# Patient Record
Sex: MALE | Race: BLACK OR AFRICAN AMERICAN | Employment: OTHER
[De-identification: names, ages, dates, MRNs, and addresses within clinical notes are randomized per-mention and may not be internally consistent; named-entity substitution may affect disease eponyms.]

---

## 2022-12-05 ENCOUNTER — OFFICE VISIT (OUTPATIENT)
Dept: INTERNAL MEDICINE CLINIC | Facility: CLINIC | Age: 57
End: 2022-12-05
Payer: COMMERCIAL

## 2022-12-05 VITALS
WEIGHT: 186 LBS | OXYGEN SATURATION: 99 % | BODY MASS INDEX: 23.13 KG/M2 | DIASTOLIC BLOOD PRESSURE: 94 MMHG | HEART RATE: 89 BPM | SYSTOLIC BLOOD PRESSURE: 170 MMHG | HEIGHT: 75 IN

## 2022-12-05 DIAGNOSIS — I49.9 IRREGULAR CARDIAC RHYTHM: ICD-10-CM

## 2022-12-05 DIAGNOSIS — I51.7 LVH (LEFT VENTRICULAR HYPERTROPHY): ICD-10-CM

## 2022-12-05 DIAGNOSIS — Z12.5 SCREENING FOR PROSTATE CANCER: ICD-10-CM

## 2022-12-05 DIAGNOSIS — R03.0 ELEVATED BLOOD PRESSURE READING: Primary | ICD-10-CM

## 2022-12-05 LAB
ERYTHROCYTE [DISTWIDTH] IN BLOOD BY AUTOMATED COUNT: 13.4 % (ref 11.9–14.6)
HCT VFR BLD AUTO: 45.2 % (ref 41.1–50.3)
HGB BLD-MCNC: 14.2 G/DL (ref 13.6–17.2)
MCH RBC QN AUTO: 30.2 PG (ref 26.1–32.9)
MCHC RBC AUTO-ENTMCNC: 31.4 G/DL (ref 31.4–35)
MCV RBC AUTO: 96.2 FL (ref 82–102)
NRBC # BLD: 0 K/UL (ref 0–0.2)
PLATELET # BLD AUTO: 310 K/UL (ref 150–450)
PMV BLD AUTO: 9.5 FL (ref 9.4–12.3)
RBC # BLD AUTO: 4.7 M/UL (ref 4.23–5.6)
WBC # BLD AUTO: 3.1 K/UL (ref 4.3–11.1)

## 2022-12-05 PROCEDURE — G8420 CALC BMI NORM PARAMETERS: HCPCS | Performed by: INTERNAL MEDICINE

## 2022-12-05 PROCEDURE — G8427 DOCREV CUR MEDS BY ELIG CLIN: HCPCS | Performed by: INTERNAL MEDICINE

## 2022-12-05 PROCEDURE — G8484 FLU IMMUNIZE NO ADMIN: HCPCS | Performed by: INTERNAL MEDICINE

## 2022-12-05 PROCEDURE — 4004F PT TOBACCO SCREEN RCVD TLK: CPT | Performed by: INTERNAL MEDICINE

## 2022-12-05 PROCEDURE — 93000 ELECTROCARDIOGRAM COMPLETE: CPT | Performed by: INTERNAL MEDICINE

## 2022-12-05 PROCEDURE — 99204 OFFICE O/P NEW MOD 45 MIN: CPT | Performed by: INTERNAL MEDICINE

## 2022-12-05 PROCEDURE — 3017F COLORECTAL CA SCREEN DOC REV: CPT | Performed by: INTERNAL MEDICINE

## 2022-12-05 RX ORDER — AMLODIPINE BESYLATE 5 MG/1
5 TABLET ORAL DAILY
Qty: 30 TABLET | Refills: 5 | Status: SHIPPED | OUTPATIENT
Start: 2022-12-05

## 2022-12-05 ASSESSMENT — ENCOUNTER SYMPTOMS
CHEST TIGHTNESS: 0
SHORTNESS OF BREATH: 0
WHEEZING: 0
CONSTIPATION: 0
ABDOMINAL PAIN: 0
DIARRHEA: 0
CHOKING: 0
COUGH: 0
BACK PAIN: 0

## 2022-12-05 NOTE — PROGRESS NOTES
Yisel Overton was seen today for new patient. Diagnoses and all orders for this visit:    Elevated blood pressure reading  -     EKG 12 Lead; Future  -     Magnesium; Future  -     Lipid Panel; Future  -     CBC; Future  -     Comprehensive Metabolic Panel; Future  -     Microalbumin / Creatinine Urine Ratio; Future  -     Hepatitis Panel, Acute; Future  -     HIV 1/2 Ag/Ab, 4TH Generation,W Rflx Confirm; Future  -     TSH; Future  -     EKG 12 Lead  -     TSH  -     HIV 1/2 Ag/Ab, 4TH Generation,W Rflx Confirm  -     Hepatitis Panel, Acute  -     Microalbumin / Creatinine Urine Ratio  -     Comprehensive Metabolic Panel  -     CBC  -     Lipid Panel  -     Magnesium    Irregular cardiac rhythm  -     EKG 12 Lead; Future  -     Magnesium; Future  -     Lipid Panel; Future  -     CBC; Future  -     Comprehensive Metabolic Panel; Future  -     Microalbumin / Creatinine Urine Ratio; Future  -     Hepatitis Panel, Acute; Future  -     HIV 1/2 Ag/Ab, 4TH Generation,W Rflx Confirm; Future  -     TSH; Future  -     EKG 12 Lead  -     TSH  -     HIV 1/2 Ag/Ab, 4TH Generation,W Rflx Confirm  -     Hepatitis Panel, Acute  -     Microalbumin / Creatinine Urine Ratio  -     Comprehensive Metabolic Panel  -     CBC  -     Lipid Panel  -     Magnesium    LVH (left ventricular hypertrophy)  -     amLODIPine (NORVASC) 5 MG tablet; Take 1 tablet by mouth daily  -     [address]    Screening for prostate cancer  -     PSA Screening; Future        Fauzia Sequeira is a 61 y.o. male    Chief Complaint   Patient presents with    New Patient     Needs PCP   Nothin g much going on  Hasnt' had healthcare        No results found for any previous visit. History reviewed. No pertinent past medical history.     Family History   Problem Relation Age of Onset    Hypertension Father         Social History     Socioeconomic History    Marital status:      Spouse name: Not on file    Number of children: Not on file    Years of education: Not on file    Highest education level: Not on file   Occupational History    Not on file   Tobacco Use    Smoking status: Every Day     Types: Cigarettes    Smokeless tobacco: Current     Types: Snuff   Vaping Use    Vaping Use: Never used   Substance and Sexual Activity    Alcohol use: Yes     Alcohol/week: 12.0 standard drinks     Types: 12 Cans of beer per week     Comment: 1 pint liquor    Drug use: Yes     Types: Marijuana Bladimir Shoemaker), Cocaine     Comment: counseled    Sexual activity: Not on file   Other Topics Concern    Not on file   Social History Narrative    Not on file     Social Determinants of Health     Financial Resource Strain: Not on file   Food Insecurity: Not on file   Transportation Needs: Not on file   Physical Activity: Not on file   Stress: Not on file   Social Connections: Not on file   Intimate Partner Violence: Not on file   Housing Stability: Not on file         Current Outpatient Medications:     amLODIPine (NORVASC) 5 MG tablet, Take 1 tablet by mouth daily, Disp: 30 tablet, Rfl: 5    No Known Allergies      Review of Systems   Constitutional:  Negative for fatigue and unexpected weight change. HENT:  Negative for congestion and hearing loss. Respiratory:  Negative for cough, choking, chest tightness, shortness of breath and wheezing. Cardiovascular:  Negative for chest pain and leg swelling. Gastrointestinal:  Negative for abdominal pain, constipation and diarrhea. Genitourinary:  Negative for dysuria. Musculoskeletal:  Negative for arthralgias and back pain. Skin:  Negative for pallor and rash. Neurological:  Negative for dizziness and headaches. Hematological:  Does not bruise/bleed easily. Psychiatric/Behavioral:  Negative for behavioral problems, sleep disturbance and suicidal ideas. The patient is not nervous/anxious.         Vitals:    12/05/22 1118 12/05/22 1131   BP: (!) 180/88 (!) 170/94   Pulse: 89    SpO2: 99%    Weight: 186 lb (84.4 kg)    Height: 6' 3\" (1.905 m)            Physical Exam  Constitutional:       General: He is not in acute distress. HENT:      Head: Normocephalic and atraumatic. Nose: Nose normal.   Eyes:      General: No scleral icterus. Extraocular Movements: Extraocular movements intact. Conjunctiva/sclera: Conjunctivae normal.   Cardiovascular:      Rate and Rhythm: Normal rate and regular rhythm. Pulses: Normal pulses. Heart sounds: Normal heart sounds. Pulmonary:      Effort: Pulmonary effort is normal. No respiratory distress. Breath sounds: Normal breath sounds. Abdominal:      General: Abdomen is flat. Bowel sounds are normal.      Palpations: Abdomen is soft. Musculoskeletal:      Cervical back: Neck supple. No rigidity. Skin:     Coloration: Skin is not jaundiced. Neurological:      General: No focal deficit present. Mental Status: He is alert. Psychiatric:         Mood and Affect: Mood normal.         Thought Content: Thought content normal.          Gerri Valero was seen today for new patient. Diagnoses and all orders for this visit:    Elevated blood pressure reading  -     EKG 12 Lead; Future  -     Magnesium; Future  -     Lipid Panel; Future  -     CBC; Future  -     Comprehensive Metabolic Panel; Future  -     Microalbumin / Creatinine Urine Ratio; Future  -     Hepatitis Panel, Acute; Future  -     HIV 1/2 Ag/Ab, 4TH Generation,W Rflx Confirm; Future  -     TSH; Future  -     EKG 12 Lead  -     TSH  -     HIV 1/2 Ag/Ab, 4TH Generation,W Rflx Confirm  -     Hepatitis Panel, Acute  -     Microalbumin / Creatinine Urine Ratio  -     Comprehensive Metabolic Panel  -     CBC  -     Lipid Panel  -     Magnesium    Irregular cardiac rhythm  -     EKG 12 Lead; Future  -     Magnesium; Future  -     Lipid Panel; Future  -     CBC; Future  -     Comprehensive Metabolic Panel; Future  -     Microalbumin / Creatinine Urine Ratio;  Future  -     Hepatitis Panel, Acute; Future  -     HIV 1/2 Ag/Ab, 4TH Generation,W Rflx Confirm; Future  -     TSH; Future  -     EKG 12 Lead  -     TSH  -     HIV 1/2 Ag/Ab, 4TH Generation,W Rflx Confirm  -     Hepatitis Panel, Acute  -     Microalbumin / Creatinine Urine Ratio  -     Comprehensive Metabolic Panel  -     CBC  -     Lipid Panel  -     Magnesium    LVH (left ventricular hypertrophy)  -     amLODIPine (NORVASC) 5 MG tablet; Take 1 tablet by mouth daily  -     [address]    Screening for prostate cancer  -     PSA Screening;  Future               Bee Hernandez DO

## 2022-12-06 LAB
ALBUMIN SERPL-MCNC: 3.7 G/DL (ref 3.5–5)
ALBUMIN/GLOB SERPL: 1 {RATIO} (ref 0.4–1.6)
ALP SERPL-CCNC: 60 U/L (ref 50–136)
ALT SERPL-CCNC: 36 U/L (ref 12–65)
ANION GAP SERPL CALC-SCNC: 3 MMOL/L (ref 2–11)
AST SERPL-CCNC: 37 U/L (ref 15–37)
BILIRUB SERPL-MCNC: 0.4 MG/DL (ref 0.2–1.1)
BUN SERPL-MCNC: 9 MG/DL (ref 6–23)
CALCIUM SERPL-MCNC: 9.6 MG/DL (ref 8.3–10.4)
CHLORIDE SERPL-SCNC: 109 MMOL/L (ref 101–110)
CHOLEST SERPL-MCNC: 213 MG/DL
CO2 SERPL-SCNC: 28 MMOL/L (ref 21–32)
CREAT SERPL-MCNC: 0.8 MG/DL (ref 0.8–1.5)
CREAT UR-MCNC: 114 MG/DL
GLOBULIN SER CALC-MCNC: 3.8 G/DL (ref 2.8–4.5)
GLUCOSE SERPL-MCNC: 78 MG/DL (ref 65–100)
HAV IGM SER QL: NONREACTIVE
HBV CORE IGM SER QL: NONREACTIVE
HBV SURFACE AG SER QL: NONREACTIVE
HCV AB SER QL: NONREACTIVE
HDLC SERPL-MCNC: 85 MG/DL (ref 40–60)
HDLC SERPL: 2.5 {RATIO}
HIV 1+2 AB+HIV1 P24 AG SERPL QL IA: NONREACTIVE
HIV 1/2 RESULT COMMENT: NORMAL
LDLC SERPL CALC-MCNC: 97.6 MG/DL
MAGNESIUM SERPL-MCNC: 2.3 MG/DL (ref 1.8–2.4)
MICROALBUMIN UR-MCNC: 0.83 MG/DL (ref 0–3)
MICROALBUMIN/CREAT UR-RTO: 7 MG/G (ref 0–30)
POTASSIUM SERPL-SCNC: 4.2 MMOL/L (ref 3.5–5.1)
PROT SERPL-MCNC: 7.5 G/DL (ref 6.3–8.2)
SODIUM SERPL-SCNC: 140 MMOL/L (ref 133–143)
TRIGL SERPL-MCNC: 152 MG/DL (ref 35–150)
TSH, 3RD GENERATION: 0.72 UIU/ML (ref 0.36–3.74)
VLDLC SERPL CALC-MCNC: 30.4 MG/DL (ref 6–23)

## 2022-12-19 ENCOUNTER — APPOINTMENT (OUTPATIENT)
Dept: CT IMAGING | Age: 57
End: 2022-12-19
Payer: COMMERCIAL

## 2022-12-19 ENCOUNTER — HOSPITAL ENCOUNTER (EMERGENCY)
Age: 57
Discharge: HOME | End: 2022-12-19
Attending: EMERGENCY MEDICINE | Admitting: EMERGENCY MEDICINE
Payer: COMMERCIAL

## 2022-12-19 ENCOUNTER — APPOINTMENT (OUTPATIENT)
Dept: GENERAL RADIOLOGY | Age: 57
End: 2022-12-19
Payer: COMMERCIAL

## 2022-12-19 VITALS
HEART RATE: 78 BPM | OXYGEN SATURATION: 100 % | BODY MASS INDEX: 28.6 KG/M2 | SYSTOLIC BLOOD PRESSURE: 119 MMHG | WEIGHT: 230 LBS | TEMPERATURE: 98.6 F | DIASTOLIC BLOOD PRESSURE: 81 MMHG | HEIGHT: 75 IN | RESPIRATION RATE: 16 BRPM

## 2022-12-19 DIAGNOSIS — T50.901A ACCIDENTAL DRUG OVERDOSE, INITIAL ENCOUNTER: ICD-10-CM

## 2022-12-19 DIAGNOSIS — R40.4 TRANSIENT ALTERATION OF AWARENESS: Primary | ICD-10-CM

## 2022-12-19 LAB
ALBUMIN SERPL-MCNC: 3.6 G/DL (ref 3.5–5)
ALBUMIN/GLOB SERPL: 0.9 {RATIO} (ref 0.4–1.6)
ALP SERPL-CCNC: 65 U/L (ref 50–136)
ALT SERPL-CCNC: 33 U/L (ref 12–65)
AMPHET UR QL SCN: NEGATIVE
ANION GAP SERPL CALC-SCNC: 9 MMOL/L (ref 2–11)
APPEARANCE UR: CLEAR
AST SERPL-CCNC: 33 U/L (ref 15–37)
BASOPHILS # BLD: 0 K/UL (ref 0–0.2)
BASOPHILS NFR BLD: 1 % (ref 0–2)
BENZODIAZ UR QL: NEGATIVE
BILIRUB SERPL-MCNC: 0.3 MG/DL (ref 0.2–1.1)
BILIRUB UR QL: NEGATIVE
BUN SERPL-MCNC: 8 MG/DL (ref 6–23)
CALCIUM SERPL-MCNC: 9.1 MG/DL (ref 8.3–10.4)
CANNABINOIDS UR QL SCN: POSITIVE
CHLORIDE SERPL-SCNC: 101 MMOL/L (ref 101–110)
CO2 SERPL-SCNC: 27 MMOL/L (ref 21–32)
COCAINE UR QL SCN: POSITIVE
COLOR UR: NORMAL
CREAT SERPL-MCNC: 0.9 MG/DL (ref 0.8–1.5)
DIFFERENTIAL METHOD BLD: ABNORMAL
EOSINOPHIL # BLD: 0.2 K/UL (ref 0–0.8)
EOSINOPHIL NFR BLD: 5 % (ref 0.5–7.8)
ERYTHROCYTE [DISTWIDTH] IN BLOOD BY AUTOMATED COUNT: 12.8 % (ref 11.9–14.6)
ETHANOL SERPL-MCNC: 74 MG/DL (ref 0–0.08)
GLOBULIN SER CALC-MCNC: 4.2 G/DL (ref 2.8–4.5)
GLUCOSE SERPL-MCNC: 82 MG/DL (ref 65–100)
GLUCOSE UR STRIP.AUTO-MCNC: NEGATIVE MG/DL
HCT VFR BLD AUTO: 44 % (ref 41.1–50.3)
HGB BLD-MCNC: 14.3 G/DL (ref 13.6–17.2)
HGB UR QL STRIP: NEGATIVE
IMM GRANULOCYTES # BLD AUTO: 0 K/UL (ref 0–0.5)
IMM GRANULOCYTES NFR BLD AUTO: 0 % (ref 0–5)
KETONES UR QL STRIP.AUTO: NEGATIVE MG/DL
LEUKOCYTE ESTERASE UR QL STRIP.AUTO: NEGATIVE
LYMPHOCYTES # BLD: 1.3 K/UL (ref 0.5–4.6)
LYMPHOCYTES NFR BLD: 36 % (ref 13–44)
MCH RBC QN AUTO: 30.5 PG (ref 26.1–32.9)
MCHC RBC AUTO-ENTMCNC: 32.5 G/DL (ref 31.4–35)
MCV RBC AUTO: 93.8 FL (ref 82–102)
MONOCYTES # BLD: 0.5 K/UL (ref 0.1–1.3)
MONOCYTES NFR BLD: 13 % (ref 4–12)
NEUTS SEG # BLD: 1.6 K/UL (ref 1.7–8.2)
NEUTS SEG NFR BLD: 45 % (ref 43–78)
NITRITE UR QL STRIP.AUTO: NEGATIVE
NRBC # BLD: 0 K/UL (ref 0–0.2)
OPIATES UR QL: NEGATIVE
PH UR STRIP: 5 [PH] (ref 5–9)
PLATELET # BLD AUTO: 244 K/UL (ref 150–450)
PMV BLD AUTO: 9.2 FL (ref 9.4–12.3)
POTASSIUM SERPL-SCNC: 3.8 MMOL/L (ref 3.5–5.1)
PROT SERPL-MCNC: 7.8 G/DL (ref 6.3–8.2)
PROT UR STRIP-MCNC: NEGATIVE MG/DL
RBC # BLD AUTO: 4.69 M/UL (ref 4.23–5.6)
SODIUM SERPL-SCNC: 137 MMOL/L (ref 133–143)
SP GR UR REFRACTOMETRY: <1.005 (ref 1–1.02)
UROBILINOGEN UR QL STRIP.AUTO: 0.2 EU/DL (ref 0.2–1)
WBC # BLD AUTO: 3.5 K/UL (ref 4.3–11.1)

## 2022-12-19 PROCEDURE — 85025 COMPLETE CBC W/AUTO DIFF WBC: CPT

## 2022-12-19 PROCEDURE — 93005 ELECTROCARDIOGRAM TRACING: CPT | Performed by: EMERGENCY MEDICINE

## 2022-12-19 PROCEDURE — 70450 CT HEAD/BRAIN W/O DYE: CPT

## 2022-12-19 PROCEDURE — 2580000003 HC RX 258: Performed by: EMERGENCY MEDICINE

## 2022-12-19 PROCEDURE — 82077 ASSAY SPEC XCP UR&BREATH IA: CPT

## 2022-12-19 PROCEDURE — 81003 URINALYSIS AUTO W/O SCOPE: CPT

## 2022-12-19 PROCEDURE — 80307 DRUG TEST PRSMV CHEM ANLYZR: CPT

## 2022-12-19 PROCEDURE — 80053 COMPREHEN METABOLIC PANEL: CPT

## 2022-12-19 PROCEDURE — 71045 X-RAY EXAM CHEST 1 VIEW: CPT

## 2022-12-19 PROCEDURE — 99285 EMERGENCY DEPT VISIT HI MDM: CPT

## 2022-12-19 RX ORDER — 0.9 % SODIUM CHLORIDE 0.9 %
1000 INTRAVENOUS SOLUTION INTRAVENOUS
Status: COMPLETED | OUTPATIENT
Start: 2022-12-19 | End: 2022-12-19

## 2022-12-19 RX ADMIN — SODIUM CHLORIDE 1000 ML: 900 INJECTION, SOLUTION INTRAVENOUS at 16:40

## 2022-12-19 ASSESSMENT — PAIN - FUNCTIONAL ASSESSMENT: PAIN_FUNCTIONAL_ASSESSMENT: NONE - DENIES PAIN

## 2022-12-19 NOTE — Clinical Note
Carrie Carson was seen and treated in our emergency department on 12/19/2022. He may return to work on 12/21/2022. If you have any questions or concerns, please don't hesitate to call.       Janey Velásquez MD

## 2022-12-19 NOTE — Clinical Note
Carlton Gandhi was seen and treated in our emergency department on 12/19/2022. He may return to work on 12/21/2022. If you have any questions or concerns, please don't hesitate to call.       Zoltan Giron MD

## 2022-12-19 NOTE — ED NOTES
ETOH bottle found in patient's sock. Placed with other belongings at bedside.       Ana Portillo RN  12/19/22 6708

## 2022-12-19 NOTE — ED TRIAGE NOTES
Patient arrives to ED via EMS. Patient had syncopal episode at work. Per brother patient has been \"in and out of it all day\". Patient just started seeing a PCP but has not started his BP medication yet. Denies chest pain. Denies SOB.

## 2022-12-19 NOTE — ED PROVIDER NOTES
Emergency Department Provider Note                   PCP:                Mirlande Malone DO               Age: 61 y.o. Sex: male       ICD-10-CM    1. Transient alteration of awareness  R40.4       2. Accidental drug overdose, initial encounter  T50.901A           DISPOSITION Decision To Discharge 12/19/2022 10:52:01 PM        MDM  Number of Diagnoses or Management Options  Accidental drug overdose, initial encounter: new, needed workup  Transient alteration of awareness: new, needed workup     Amount and/or Complexity of Data Reviewed  Clinical lab tests: ordered and reviewed  Tests in the radiology section of CPT®: ordered and reviewed  Tests in the medicine section of CPT®: ordered and reviewed (ECG interpretation for ECG dated 19 December 2022 at 3:35 PM: ECG reveals a sinus rhythm with marked sinus arrhythmia at a rate of 81 bpm, normal [place] and QTc intervals and normal axis. ST-T abnormalities in both inferior and anterior lateral regions. Abnormal ECG. Paul Perez MD  )  Review and summarize past medical records: yes    Risk of Complications, Morbidity, and/or Mortality  Presenting problems: high  Diagnostic procedures: moderate  Management options: moderate    Patient Progress  Patient progress: improved       ED Course as of 12/20/22 0218   Mon Dec 19, 2022   1805 Alcohol level only 76. Does not explain the patient's altered mental state, will obtain CT. [BB]   1944 Patient sleeping on the cart, easily awakened. No focal deficits. Admits to both cocaine and THC use earlier today. Will attempt to ambulate. [BB]   2104 Patient lethargic but appropriate with responses. Still cannot get him to stand up, and for the most part refuses to roll over for me. I suspect he has fallen victim to polypharmacy. Plan to recheck in 2 to 3 hours and see if we get him up; vitals remained stable and lab work is unremarkable. [BB]   6699 On recheck, patient how I knew that he had taken multiple drugs.   I told him it was a process of elimination and he also told me he had taken them. [BB]      ED Course User Index  [BB] Qiana Cobos MD        Orders Placed This Encounter   Procedures    XR CHEST PORTABLE    CT HEAD WO CONTRAST    CBC with Auto Differential    Comprehensive Metabolic Panel    Drug Screen Psych, Urine    Urinalysis w rflx microscopic    Ethanol    EKG 12 Lead        Medications   0.9 % sodium chloride bolus (0 mLs IntraVENous Stopped 12/19/22 2010)       Discharge Medication List as of 12/19/2022 11:02 PM           Clint Cifuentes is a 61 y.o. male who presents to the Emergency Department with chief complaint of    Chief Complaint   Patient presents with    Loss of Consciousness      59-year-old black male presents emerged department complaining of syncopal episode at work. Per the patient's brother the patient has been in and out of it all day. According to brother he recently saw his primary care doctor and was started on blood pressure medicine but he has not started taking it yet. Patient denies any pain. The history is provided by the patient and a relative. The history is limited by the condition of the patient. Review of Systems   Unable to perform ROS: Mental status change     History reviewed. No pertinent past medical history. Past Surgical History:   Procedure Laterality Date    EYE SURGERY Left 2016    KNEE SURGERY Left 1979    pin and plate        Family History   Problem Relation Age of Onset    Hypertension Father         Social History     Socioeconomic History    Marital status:      Spouse name: None    Number of children: None    Years of education: None    Highest education level: None   Tobacco Use    Smoking status: Every Day     Types: Cigarettes    Smokeless tobacco: Current     Types: Snuff   Vaping Use    Vaping Use: Never used   Substance and Sexual Activity    Alcohol use:  Yes     Alcohol/week: 12.0 standard drinks     Types: 12 Cans of beer per week     Comment: 1 pint liquor    Drug use: Yes     Types: Marijuana Martita Pleas), Cocaine     Comment: counseled         Patient has no known allergies. Discharge Medication List as of 12/19/2022 11:02 PM        CONTINUE these medications which have NOT CHANGED    Details   amLODIPine (NORVASC) 5 MG tablet Take 1 tablet by mouth daily, Disp-30 tablet, R-5Normal              Vitals signs and nursing note reviewed. Patient Vitals for the past 4 hrs:   Pulse Resp BP   12/19/22 2301 78 16 119/81   12/19/22 2246 75 15 112/67   12/19/22 2231 74 19 114/71          Physical Exam  Vitals and nursing note reviewed. Constitutional:       General: He is not in acute distress. Appearance: Normal appearance. He is not ill-appearing, toxic-appearing or diaphoretic. Comments: Patient noted to have 1/2 pint of alcohol wrapped in the right sock, and a pack of cigarettes in the left sock   HENT:      Head: Normocephalic and atraumatic. Right Ear: External ear normal.      Left Ear: External ear normal.      Nose: Nose normal.      Mouth/Throat:      Mouth: Mucous membranes are moist.   Eyes:      Extraocular Movements: Extraocular movements intact. Conjunctiva/sclera: Conjunctivae normal.      Pupils: Pupils are equal, round, and reactive to light. Cardiovascular:      Rate and Rhythm: Normal rate and regular rhythm. Heart sounds: No murmur heard. Pulmonary:      Effort: Pulmonary effort is normal.      Breath sounds: Normal breath sounds. Abdominal:      General: Bowel sounds are normal.      Palpations: Abdomen is soft. Tenderness: There is no abdominal tenderness. Musculoskeletal:         General: Normal range of motion. Cervical back: Normal range of motion and neck supple. Skin:     General: Skin is warm and dry. Neurological:      General: No focal deficit present. Mental Status: He is lethargic. GCS: GCS eye subscore is 3. GCS verbal subscore is 4.  GCS motor subscore is 6.      Cranial Nerves: Cranial nerves 2-12 are intact. Sensory: Sensation is intact. Motor: Motor function is intact. Deep Tendon Reflexes: Reflexes normal.   Psychiatric:         Speech: Speech is delayed. Behavior: Behavior is slowed. Cognition and Memory: Cognition is impaired. Memory is impaired. Comments: Patient resting comfortably on the cart in no distress, slow to respond when stimulated and at that point states he is knows he is in [place] somewhere. Admits to having some alcohol today but states it was not a lot. Denies drug use. Procedures    The patient was observed in the ED. At time of discharge count patient much more alert and oriented x3.     Results Reviewed:      Recent Results (from the past 24 hour(s))   EKG 12 Lead    Collection Time: 12/19/22  3:35 PM   Result Value Ref Range    Ventricular Rate 81 BPM    Atrial Rate 81 BPM    P-R Interval 194 ms    QRS Duration 112 ms    Q-T Interval 404 ms    QTc Calculation (Bazett) 469 ms    P Axis 55 degrees    R Axis -2 degrees    T Axis -75 degrees    Diagnosis       !!! Poor data quality, interpretation may be adversely affected   CBC with Auto Differential    Collection Time: 12/19/22  4:04 PM   Result Value Ref Range    WBC 3.5 (L) 4.3 - 11.1 K/uL    RBC 4.69 4.23 - 5.6 M/uL    Hemoglobin 14.3 13.6 - 17.2 g/dL    Hematocrit 44.0 41.1 - 50.3 %    MCV 93.8 82 - 102 FL    MCH 30.5 26.1 - 32.9 PG    MCHC 32.5 31.4 - 35.0 g/dL    RDW 12.8 11.9 - 14.6 %    Platelets 096 654 - 661 K/uL    MPV 9.2 (L) 9.4 - 12.3 FL    nRBC 0.00 0.0 - 0.2 K/uL    Differential Type AUTOMATED      Seg Neutrophils 45 43 - 78 %    Lymphocytes 36 13 - 44 %    Monocytes 13 (H) 4.0 - 12.0 %    Eosinophils % 5 0.5 - 7.8 %    Basophils 1 0.0 - 2.0 %    Immature Granulocytes 0 0.0 - 5.0 %    Segs Absolute 1.6 (L) 1.7 - 8.2 K/UL    Absolute Lymph # 1.3 0.5 - 4.6 K/UL    Absolute Mono # 0.5 0.1 - 1.3 K/UL    Absolute Eos # 0.2 0.0 - 0.8 K/UL    Basophils Absolute 0.0 0.0 - 0.2 K/UL    Absolute Immature Granulocyte 0.0 0.0 - 0.5 K/UL   Comprehensive Metabolic Panel    Collection Time: 12/19/22  4:04 PM   Result Value Ref Range    Sodium 137 133 - 143 mmol/L    Potassium 3.8 3.5 - 5.1 mmol/L    Chloride 101 101 - 110 mmol/L    CO2 27 21 - 32 mmol/L    Anion Gap 9 2 - 11 mmol/L    Glucose 82 65 - 100 mg/dL    BUN 8 6 - 23 MG/DL    Creatinine 0.90 0.8 - 1.5 MG/DL    Est, Glom Filt Rate >60 >60 ml/min/1.73m2    Calcium 9.1 8.3 - 10.4 MG/DL    Total Bilirubin 0.3 0.2 - 1.1 MG/DL    ALT 33 12 - 65 U/L    AST 33 15 - 37 U/L    Alk Phosphatase 65 50 - 136 U/L    Total Protein 7.8 6.3 - 8.2 g/dL    Albumin 3.6 3.5 - 5.0 g/dL    Globulin 4.2 2.8 - 4.5 g/dL    Albumin/Globulin Ratio 0.9 0.4 - 1.6     Ethanol    Collection Time: 12/19/22  4:04 PM   Result Value Ref Range    Ethanol Lvl 74 MG/DL   Drug Screen Psych, Urine    Collection Time: 12/19/22  5:40 PM   Result Value Ref Range    Benzodiazepines, Urine Negative NEG      Opiates, Urine Negative NEG      Amphetamine, Urine Negative NEG      Cocaine, Urine Positive (A) NEG      THC, TH-Cannabinol, Urine Positive (A) NEG     Urinalysis w rflx microscopic    Collection Time: 12/19/22  5:40 PM   Result Value Ref Range    Color, UA YELLOW/STRAW      Appearance CLEAR      Specific Gravity, UA <1.005 1.001 - 1.023    pH, Urine 5.0 5.0 - 9.0      Protein, UA Negative NEG mg/dL    Glucose, UA Negative mg/dL    Ketones, Urine Negative NEG mg/dL    Bilirubin Urine Negative NEG      Blood, Urine Negative NEG      Urobilinogen, Urine 0.2 0.2 - 1.0 EU/dL    Nitrite, Urine Negative NEG      Leukocyte Esterase, Urine Negative NEG       CT HEAD WO CONTRAST   Final Result      1. Left orbital floor reconstruction with opacified left maxillary sinus. 2. Otherwise unremarkable noncontrast head CT. XR CHEST PORTABLE   Final Result   The lungs are clear. The heart is normal in size. Sternotomy   changes are present.

## 2022-12-20 LAB
EKG ATRIAL RATE: 81 BPM
EKG DIAGNOSIS: NORMAL
EKG P AXIS: 55 DEGREES
EKG P-R INTERVAL: 194 MS
EKG Q-T INTERVAL: 404 MS
EKG QRS DURATION: 112 MS
EKG QTC CALCULATION (BAZETT): 469 MS
EKG R AXIS: -2 DEGREES
EKG T AXIS: -75 DEGREES
EKG VENTRICULAR RATE: 81 BPM

## 2022-12-20 NOTE — ED NOTES
Voicemail left with patient's aunt, Alli Gunderson, at patient's request for ride home. Patient served dinner and provided phone to continue to call family.       Ricarda Kayser, RN  12/19/22 [id number]

## 2022-12-20 NOTE — ED NOTES
Patient is now awake alert and oriented. Answering questions appropriately. Discharged as ordered by MD with family to  from waiting room.       Renae Severs, RN  12/19/22 1835

## 2023-03-06 ENCOUNTER — OFFICE VISIT (OUTPATIENT)
Dept: INTERNAL MEDICINE CLINIC | Facility: CLINIC | Age: 58
End: 2023-03-06
Payer: COMMERCIAL

## 2023-03-06 VITALS
DIASTOLIC BLOOD PRESSURE: 70 MMHG | BODY MASS INDEX: 24.12 KG/M2 | HEART RATE: 95 BPM | WEIGHT: 193 LBS | SYSTOLIC BLOOD PRESSURE: 160 MMHG | OXYGEN SATURATION: 97 %

## 2023-03-06 DIAGNOSIS — Z12.11 SCREENING FOR COLON CANCER: ICD-10-CM

## 2023-03-06 DIAGNOSIS — R03.0 ELEVATED BLOOD PRESSURE READING: Primary | ICD-10-CM

## 2023-03-06 DIAGNOSIS — L72.9 CYST OF SKIN: ICD-10-CM

## 2023-03-06 DIAGNOSIS — I51.7 LVH (LEFT VENTRICULAR HYPERTROPHY): ICD-10-CM

## 2023-03-06 PROCEDURE — 99214 OFFICE O/P EST MOD 30 MIN: CPT | Performed by: INTERNAL MEDICINE

## 2023-03-06 PROCEDURE — 3017F COLORECTAL CA SCREEN DOC REV: CPT | Performed by: INTERNAL MEDICINE

## 2023-03-06 PROCEDURE — 4004F PT TOBACCO SCREEN RCVD TLK: CPT | Performed by: INTERNAL MEDICINE

## 2023-03-06 PROCEDURE — G8484 FLU IMMUNIZE NO ADMIN: HCPCS | Performed by: INTERNAL MEDICINE

## 2023-03-06 PROCEDURE — G8427 DOCREV CUR MEDS BY ELIG CLIN: HCPCS | Performed by: INTERNAL MEDICINE

## 2023-03-06 PROCEDURE — G8420 CALC BMI NORM PARAMETERS: HCPCS | Performed by: INTERNAL MEDICINE

## 2023-03-06 RX ORDER — AMLODIPINE BESYLATE 5 MG/1
5 TABLET ORAL DAILY
Qty: 90 TABLET | Refills: 3 | Status: SHIPPED | OUTPATIENT
Start: 2023-03-06

## 2023-03-06 RX ORDER — FLUTICASONE PROPIONATE 50 MCG
2 SPRAY, SUSPENSION (ML) NASAL DAILY
Qty: 16 G | Refills: 5 | Status: SHIPPED | OUTPATIENT
Start: 2023-03-06

## 2023-03-06 SDOH — ECONOMIC STABILITY: FOOD INSECURITY: WITHIN THE PAST 12 MONTHS, THE FOOD YOU BOUGHT JUST DIDN'T LAST AND YOU DIDN'T HAVE MONEY TO GET MORE.: NEVER TRUE

## 2023-03-06 SDOH — ECONOMIC STABILITY: HOUSING INSECURITY
IN THE LAST 12 MONTHS, WAS THERE A TIME WHEN YOU DID NOT HAVE A STEADY PLACE TO SLEEP OR SLEPT IN A SHELTER (INCLUDING NOW)?: NO

## 2023-03-06 SDOH — ECONOMIC STABILITY: INCOME INSECURITY: HOW HARD IS IT FOR YOU TO PAY FOR THE VERY BASICS LIKE FOOD, HOUSING, MEDICAL CARE, AND HEATING?: SOMEWHAT HARD

## 2023-03-06 SDOH — ECONOMIC STABILITY: FOOD INSECURITY: WITHIN THE PAST 12 MONTHS, YOU WORRIED THAT YOUR FOOD WOULD RUN OUT BEFORE YOU GOT MONEY TO BUY MORE.: NEVER TRUE

## 2023-03-06 ASSESSMENT — PATIENT HEALTH QUESTIONNAIRE - PHQ9
SUM OF ALL RESPONSES TO PHQ QUESTIONS 1-9: 0
2. FEELING DOWN, DEPRESSED OR HOPELESS: 0
SUM OF ALL RESPONSES TO PHQ9 QUESTIONS 1 & 2: 0
1. LITTLE INTEREST OR PLEASURE IN DOING THINGS: 0

## 2023-03-06 NOTE — PROGRESS NOTES
Koreen Runner was seen today for knee pain, cyst and head congestion. Diagnoses and all orders for this visit:    Elevated blood pressure reading    LVH (left ventricular hypertrophy)  -     amLODIPine (NORVASC) 5 MG tablet; Take 1 tablet by mouth daily    Cyst of skin  -     [facility], [address] [place] [address]    Screening for colon cancer  -     [facility], [facility]    Other orders  -     fluticasone (FLONASE) 50 MCG/ACT nasal spray; 2 sprays by Each Nostril route daily  -     diclofenac sodium (VOLTAREN) 1 % GEL; Apply 2 g topically 4 times daily        Nagi Doll is a 61 y.o. male    Chief Complaint   Patient presents with    Knee Pain     Left     Cyst     On the back of his head    Head Congestion     Left side       --left knee -pain up leg, back. Pain began 1979,worsening lately, working hard    Got hit head in 1980s knot on scalp since    Using cocaine +thc still     --  Admission on 12/19/2022, Discharged on 12/19/2022   Component Date Value Ref Range Status    Ventricular Rate 12/19/2022 81  BPM Final    Atrial Rate 12/19/2022 81  BPM Final    P-R Interval 12/19/2022 194  ms Final    QRS Duration 12/19/2022 112  ms Final    Q-T Interval 12/19/2022 404  ms Final    QTc Calculation (Bazett) 12/19/2022 469  ms Final    P Axis 12/19/2022 55  degrees Final    R Axis 12/19/2022 -2  degrees Final    T Axis 12/19/2022 -75  degrees Final    Diagnosis 12/19/2022 !!!  Poor data quality, interpretation may be adversely affected   Final    WBC 12/19/2022 3.5 (A)  4.3 - 11.1 K/uL Final    RBC 12/19/2022 4.69  4.23 - 5.6 M/uL Final    Hemoglobin 12/19/2022 14.3  13.6 - 17.2 g/dL Final    Hematocrit 12/19/2022 44.0  41.1 - 50.3 % Final    MCV 12/19/2022 93.8  82 - 102 FL Final    MCH 12/19/2022 30.5  26.1 - 32.9 PG Final    MCHC 12/19/2022 32.5  31.4 - 35.0 g/dL Final    RDW 12/19/2022 12.8  11.9 - 14.6 % Final    Platelets 04/34/6137 244  150 - 450 K/uL Final    MPV 12/19/2022 9.2 (A)  9.4 - 12.3 FL Final    nRBC 12/19/2022 0.00  0.0 - 0.2 K/uL Final    **Note: Absolute NRBC parameter is now reported with Hemogram**    Differential Type 12/19/2022 AUTOMATED    Final    Seg Neutrophils 12/19/2022 45  43 - 78 % Final    Lymphocytes 12/19/2022 36  13 - 44 % Final    Monocytes 12/19/2022 13 (A)  4.0 - 12.0 % Final    Eosinophils % 12/19/2022 5  0.5 - 7.8 % Final    Basophils 12/19/2022 1  0.0 - 2.0 % Final    Immature Granulocytes 12/19/2022 0  0.0 - 5.0 % Final    Segs Absolute 12/19/2022 1.6 (A)  1.7 - 8.2 K/UL Final    Absolute Lymph # 12/19/2022 1.3  0.5 - 4.6 K/UL Final    Absolute Mono # 12/19/2022 0.5  0.1 - 1.3 K/UL Final    Absolute Eos # 12/19/2022 0.2  0.0 - 0.8 K/UL Final    Basophils Absolute 12/19/2022 0.0  0.0 - 0.2 K/UL Final    Absolute Immature Granulocyte 12/19/2022 0.0  0.0 - 0.5 K/UL Final    Sodium 12/19/2022 137  133 - 143 mmol/L Final    Potassium 12/19/2022 3.8  3.5 - 5.1 mmol/L Final    Chloride 12/19/2022 101  101 - 110 mmol/L Final    CO2 12/19/2022 27  21 - 32 mmol/L Final    Anion Gap 12/19/2022 9  2 - 11 mmol/L Final    Glucose 12/19/2022 82  65 - 100 mg/dL Final    BUN 12/19/2022 8  6 - 23 MG/DL Final    Creatinine 12/19/2022 0.90  0.8 - 1.5 MG/DL Final    Est, Glom Filt Rate 12/19/2022 >60  >60 ml/min/1.73m2 Final    Comment:      Pediatric calculator link: Abiodunow.at. org/professionals/kdoqi/gfr_calculatorped       These results are not intended for use in patients <25years of age. eGFR results are calculated without a race factor using  the 2021 CKD-EPI equation. Careful clinical correlation is recommended, particularly when comparing to results calculated using previous equations. The CKD-EPI equation is less accurate in patients with extremes of muscle mass, extra-renal metabolism of creatinine, excessive creatine ingestion, or following therapy that affects renal tubular secretion.       Calcium 12/19/2022 9.1  8.3 - 10.4 MG/DL Final    Total Bilirubin 12/19/2022 0.3  0.2 - 1.1 MG/DL Final    ALT 12/19/2022 33  12 - 65 U/L Final    AST 12/19/2022 33  15 - 37 U/L Final    Alk Phosphatase 12/19/2022 65  50 - 136 U/L Final    Total Protein 12/19/2022 7.8  6.3 - 8.2 g/dL Final    Albumin 12/19/2022 3.6  3.5 - 5.0 g/dL Final    Globulin 12/19/2022 4.2  2.8 - 4.5 g/dL Final    Albumin/Globulin Ratio 12/19/2022 0.9  0.4 - 1.6   Final    Benzodiazepines, Urine 12/19/2022 Negative  NEG   Final    Cutoff Level: 200 ng/mL    Opiates, Urine 12/19/2022 Negative  NEG   Final    Cutoff Level: 300 ng/mL    Amphetamine, Urine 12/19/2022 Negative  NEG   Final    Cutoff Level: 300 ng/mL    Cocaine, Urine 12/19/2022 Positive (A)  NEG   Final    Cutoff Level: 300 ng/mL    THC, TH-Cannabinol, Urine 12/19/2022 Positive (A)  NEG   Final    Cutoff Level: 50 ng/mL    Color, UA 12/19/2022 YELLOW/STRAW    Final    Color Reference Range: Straw, Yellow or Dark Yellow    Appearance 12/19/2022 CLEAR    Final    Specific Gravity, UA 12/19/2022 <1.005  1.001 - 1.023 Final    pH, Urine 12/19/2022 5.0  5.0 - 9.0   Final    Protein, UA 12/19/2022 Negative  NEG mg/dL Final    Glucose, UA 12/19/2022 Negative  mg/dL Final    Ketones, Urine 12/19/2022 Negative  NEG mg/dL Final    Bilirubin Urine 12/19/2022 Negative  NEG   Final    Blood, Urine 12/19/2022 Negative  NEG   Final    Urobilinogen, Urine 12/19/2022 0.2  0.2 - 1.0 EU/dL Final    Nitrite, Urine 12/19/2022 Negative  NEG   Final    Leukocyte Esterase, Urine 12/19/2022 Negative  NEG   Final    Ethanol Lvl 12/19/2022 74  MG/DL Final    Potentially toxic: >400.0        History reviewed. No pertinent past medical history.     Family History   Problem Relation Age of Onset    Hypertension Father         Social History     Socioeconomic History    Marital status:      Spouse name: Not on file    Number of children: Not on file    Years of education: Not on file    Highest education level: Not on file   Occupational History    Not on file Tobacco Use    Smoking status: Every Day     Types: Cigarettes    Smokeless tobacco: Current     Types: Snuff   Vaping Use    Vaping Use: Never used   Substance and Sexual Activity    Alcohol use: Yes     Alcohol/week: 12.0 standard drinks     Types: 12 Cans of beer per week     Comment: 1 pint liquor    Drug use: Yes     Types: Marijuana Charmayne Stai), Cocaine     Comment: counseled    Sexual activity: Not on file   Other Topics Concern    Not on file   Social History Narrative    Not on file     Social Determinants of Health     Financial Resource Strain: Medium Risk    Difficulty of Paying Living Expenses: Somewhat hard   Food Insecurity: No Food Insecurity    Worried About Running Out of Food in the Last Year: Never true    Ran Out of Food in the Last Year: Never true   Transportation Needs: Unmet Transportation Needs    Lack of Transportation (Medical): Not on file    Lack of Transportation (Non-Medical): Yes   Physical Activity: Not on file   Stress: Not on file   Social Connections: Not on file   Intimate Partner Violence: Not on file   Housing Stability: Unknown    Unable to Pay for Housing in the Last Year: Not on file    Number of Places Lived in the Last Year: Not on file    Unstable Housing in the Last Year: No         Current Outpatient Medications:     fluticasone (FLONASE) 50 MCG/ACT nasal spray, 2 sprays by Each Nostril route daily, Disp: 16 g, Rfl: 5    diclofenac sodium (VOLTAREN) 1 % GEL, Apply 2 g topically 4 times daily, Disp: 150 g, Rfl: 5    amLODIPine (NORVASC) 5 MG tablet, Take 1 tablet by mouth daily, Disp: 90 tablet, Rfl: 3    No Known Allergies      Review of Systems      Vitals:    03/06/23 1419 03/06/23 1429   BP: (!) 170/80 (!) 160/70   Pulse: 95    SpO2: 97%    Weight: 193 lb (87.5 kg)            Physical Exam  HENT:      Head:     Musculoskeletal:      Left knee: No effusion or erythema. Normal range of motion. Tenderness present over the medial joint line.         Legs:           Verlan Low E was seen today for knee pain, cyst and head congestion. Diagnoses and all orders for this visit:    Elevated blood pressure reading    LVH (left ventricular hypertrophy)  -     amLODIPine (NORVASC) 5 MG tablet; Take 1 tablet by mouth daily    Cyst of skin  -     [facility]    Screening for colon cancer  -     [facility]    Other orders  -     fluticasone (FLONASE) 50 MCG/ACT nasal spray; 2 sprays by Each Nostril route daily  -     diclofenac sodium (VOLTAREN) 1 % GEL;  Apply 2 g topically 4 times daily          Advised stop using cocaine etc. May kill or harm him     Charles Vegas, DO

## 2023-03-06 NOTE — PATIENT INSTRUCTIONS
TRANSPORTATION RESOURCES    Medicaid Maynard Husbands: Melissa Reeves NEW NAME - [employer]   Phone:  [phone]  Must call for a ride at least 3 days before your appointment. Call Monday- Friday 8:00am to 5:00pm.    Transportation is available for MD appts, dialysis, x-rays, lab work, drug store, or other medical appointments. Kenton Cook on Aging  What they offer: Provides assistance and medical transport to adults 60 years and older. Phone: [phone]    BringIt   What they offer: Charge a fee for transport (not free)  Phone: [phone]    Transportation on Demand   What they offer: Charge a fee for transport (not free)  Phone: 21 [phone] they offer: Isra Main is accessible via an online platform that connects patients with non-emergency medical transportation (NEMT.) Roundtrip is a comprehensive solution which supports all levels of transport: rideshare (Lyft/Uber), Taxis, wheelchair vans, stretcher vehicles, ALS/BLS, and all payors when and where they are needed. https://TraveDoc/      Need additional resources? Call 211 or visit Find Help:  https://www. findhelp.org/FINANCIAL RESOURCES    [employer]   o What they offer: The [employer] Program helps uninsured patients who do not qualify for government-sponsored health insurance and cannot afford to pay for their medical care. Insured patients may also qualify for assistance based on family income, family size, and medical needs. o Phone Number: [phone]      o How to apply for the [employer] Program:   Option 1: To apply for financial assistance, a patient (or their family or other provider) should fill out the Financial Assistance Application. Copies of the Financial Assistance Application and the FAP may be obtained for free by calling the [facility] customer service department at [phone].    Option 2: The Financial Assistance Application and policy may be obtained for free by downloading a copy from the Wireless TechegHookflash: o http://espinoza-mitchell.org/. com/patient-resources/financial-assistance       o Applications are available in several languages on the website     [employer]  What they offer:  May be able to assist with medical bills if you are uninsured. Phone number: [phone]  www. Northern Power Systems    [employer]x and Resources for the Mathur's (uninsured and under insured) A Nurse and  are available. Armenian speaking staff available   What they offer: Provide information and assistance for the whole family  Discuss your health and help you find a doctor if you need one. Answer questions about your medications. Diabetes Self-Management education. Connect you with financial assistance agencies, social and medical services. Provide moral support during difficult times. Unfortunately they are unable to administer any medicine, provide you with money or transportation in our vehicles. However, the team will try to help you with your health needs. Phone: [phone] to make an appointment. Leave voice mail with name and call back number. Medication Cost Assistance    Good Rx    o What they offer: Good Rx tracks prescription drug prices and provides drug  coupons for discounts on medications. o Website: Thimble [employer]/     NeedyMeds   o What they offer: NeedyMeds offers free information on medications and healthcare cost savings programs including prescription assistance programs, coupons, and discount programs. o Website: PaymentBack.Valen Analytics. org/   o Helpline: 664.981.1782     RX Assist   o What they offer: Information about free and low-cost medicine programs.    o Website: https://Shopcliq/     [employer] $4 Prescription Program   o What they offer: Prescription Program includes up to a 30-day supply for $4 and a 90-day supply for $10 of some covered generic drugs at commonly prescribed dosages   o Website: BetaJairouesmike     Devere Pencil  What they offer:  If you are uninsured and cannot afford the prescription medicine you need, you may be able to have your prescriptions filled at no cost through appbackr. You must live in [facility]. To find out if you qualify. Website: Flex Pharma.it    Cost Plus Drugs  What they offer:  Low-cost versions of high-cost generic drugs  Website: https://costWattvision. VideoGenie/    ChristianaCare of   https://[facility]/  Phone: [phone]      Need additional resources? Call 211 or Find Help: https://www. Shopflick.org/FOOD RESOURCES    Meals on Wheels:  What they offer: Meals on Wheels is a program that delivers meals to individuals who have no reliable means for maintaining a healthy diet. Brent Phone: [phone]  www. AnyCloudBari. [address]:  What they offer:  Anyone is eligible to order, but Dakota Bullard is specifically designed for customers who could benefit from accessible, low-cost fresh food. Fresh Food Boxes are $15* with credit/debit card and are ALWAYS $5 with SNAP/EBT   Boxes are distributed every other week and you must preorder your box through their website  Drive-thru box pickup is every other Wednesday from 11 am-6 pm at: [address] [facility], [address] [place] [address]  Website:  www.[facility] Hospitalage[facility]. org/fsg     Food Pantries:   [employer]arsh [employer] Yan   Phone: [phone]  Located in [address].  Open Thursdays 8a-12p  Website: www.T[employer]ycle [employer]: [phone]  Located at [address] 8am-12pm Fridays  Website: https://Domos Labsstries. org/   [address] Pantry  Phone: [phone]  Located at [address].  Call for Pantry hours and availability  Website: OKpanda.ee  Water of [address] [place] [address] Pantry  Phone: [phone]  Located at [phone]  Call for Pantry hours and availability    Website: Primo.pl. php  [address]  Phone: [phone]  Located at [address]. Emergency Food Pantry Hours: Mon, Wed, and Fri 9am-1pm  Website: http://wwwMisoca/  [address] Pantry  Phone: [phone]  Located at [facility]. Call for hours and availability   Website: https://Shenzhen Haiya [employer] Development/  [address] Pantry  Phone: [phone]  Located at [address]. Call for hours and availability; serves up to 76 families a week, based on donations  Website: https://TriggerMail. Essen BioScience/      Need additional resources? Call 211 or Find Help: https://www. findhelp.org/HOUSING [address] [place] [address]  What they offer: Housing Choice Vouchers (Section 8) rental assistance available to persons with disabilities, families with children, and older adults whose household income is below 80% the median income for [facility]. http://www.Spreadtrum Communications/. net  Phone: 941.336.2976    [address] they offer: Free 24/7 access to trained counselors for local resources and assistance   o Website: Protectus Technologies.uk. asp   o Phone Number: [phone] (text messaging accepted)       EmergenSee. com:https://www. BioTrove/wauhrjnybm-sgvinb-aa/    [facility]wide SemiCulture Machine.co.za    [facility]. We.RoyaltyShare/pdfs/resources-Dawson. pdf    Pau Financial Guide: TripleFare.com.cy. php     [place] [facility] List: http://Northwest Medical Centerections. org/need-help      Need additional resources?  Call 211 or visit Find Help:  https://www. findhelp.org/

## 2023-03-23 ENCOUNTER — PREP FOR PROCEDURE (OUTPATIENT)
Dept: SURGERY | Age: 58
End: 2023-03-23

## 2023-03-23 ENCOUNTER — OFFICE VISIT (OUTPATIENT)
Dept: SURGERY | Age: 58
End: 2023-03-23

## 2023-03-23 VITALS
HEIGHT: 75 IN | HEART RATE: 64 BPM | SYSTOLIC BLOOD PRESSURE: 110 MMHG | DIASTOLIC BLOOD PRESSURE: 64 MMHG | OXYGEN SATURATION: 97 % | WEIGHT: 193 LBS | BODY MASS INDEX: 24 KG/M2

## 2023-03-23 DIAGNOSIS — Z12.11 SCREENING FOR COLON CANCER: Primary | ICD-10-CM

## 2023-03-23 DIAGNOSIS — R22.0 SCALP MASS: ICD-10-CM

## 2023-03-23 RX ORDER — SODIUM CHLORIDE 9 MG/ML
INJECTION, SOLUTION INTRAVENOUS PRN
Status: CANCELLED | OUTPATIENT
Start: 2023-03-23

## 2023-03-23 RX ORDER — SODIUM CHLORIDE 0.9 % (FLUSH) 0.9 %
5-40 SYRINGE (ML) INJECTION EVERY 12 HOURS SCHEDULED
Status: CANCELLED | OUTPATIENT
Start: 2023-03-23

## 2023-03-23 RX ORDER — SODIUM CHLORIDE 0.9 % (FLUSH) 0.9 %
5-40 SYRINGE (ML) INJECTION PRN
Status: CANCELLED | OUTPATIENT
Start: 2023-03-23

## 2023-03-23 NOTE — PROGRESS NOTES
General Surgery New Patient Office Note:    3/23/2023    Fuad Hernandez  MRN: [id number]      CHIEF COMPLAINT: Posterior scalp mass, screening colonoscopy    PRIMARY CARE PHYSICIAN: Sylvie Gu DO    HISTORY: Patient presents with a posterior scalp mass. He states that it has been getting larger in size. Patient states that it is causing him discomfort. Patient states he also has never had a colonoscopy. He does have a family history of colon cancer in his father. Denies any blood in his stool. Denies any changes in bowel habits. He has had a previous trauma ex lap but has no idea what was found at that point in time. REVIEW OF SYSTEMS: 10 Point ROS negative except what is in HPI      History reviewed. No pertinent past medical history. Current Outpatient Medications   Medication Sig Dispense Refill    fluticasone (FLONASE) 50 MCG/ACT nasal spray 2 sprays by Each Nostril route daily 16 g 5    diclofenac sodium (VOLTAREN) 1 % GEL Apply 2 g topically 4 times daily 150 g 5    amLODIPine (NORVASC) 5 MG tablet Take 1 tablet by mouth daily 90 tablet 3     No current facility-administered medications for this visit. Family History   Problem Relation Age of Onset    Hypertension Father        Social History     Socioeconomic History    Marital status:      Spouse name: None    Number of children: None    Years of education: None    Highest education level: None   Tobacco Use    Smoking status: Every Day     Types: Cigarettes    Smokeless tobacco: Current     Types: Snuff   Vaping Use    Vaping Use: Never used   Substance and Sexual Activity    Alcohol use:  Yes     Alcohol/week: 12.0 standard drinks     Types: 12 Cans of beer per week     Comment: 1 pint liquor    Drug use: Yes     Types: Marijuana Anahi Barbour), Cocaine     Comment: counseled     Social Determinants of Health     Financial Resource Strain: Medium Risk    Difficulty of Paying Living Expenses: Somewhat hard   Food Insecurity: No Food

## 2023-04-22 PROBLEM — Z12.11 SCREENING FOR COLON CANCER: Status: RESOLVED | Noted: 2023-03-23 | Resolved: 2023-04-22

## 2023-04-28 ENCOUNTER — PREP FOR PROCEDURE (OUTPATIENT)
Dept: SURGERY | Age: 58
End: 2023-04-28

## 2023-05-11 ENCOUNTER — ANESTHESIA EVENT (OUTPATIENT)
Dept: SURGERY | Age: 58
End: 2023-05-11

## 2023-05-11 NOTE — PERIOP NOTE
Patient has not phone of his own for contact. Family member numbers are the only numbers for contact in the chart. Have called family several times and asked them to give patient phone number for our office for return call. No call returned from patient to PAT to do phone PAT.

## 2023-05-12 ENCOUNTER — HOSPITAL ENCOUNTER (OUTPATIENT)
Age: 58
Setting detail: OUTPATIENT SURGERY
Discharge: HOME | End: 2023-05-12
Attending: STUDENT IN AN ORGANIZED HEALTH CARE EDUCATION/TRAINING PROGRAM | Admitting: STUDENT IN AN ORGANIZED HEALTH CARE EDUCATION/TRAINING PROGRAM

## 2023-05-12 ENCOUNTER — ANESTHESIA (OUTPATIENT)
Dept: SURGERY | Age: 58
End: 2023-05-12

## 2023-05-12 VITALS
SYSTOLIC BLOOD PRESSURE: 143 MMHG | OXYGEN SATURATION: 99 % | DIASTOLIC BLOOD PRESSURE: 80 MMHG | TEMPERATURE: 97.8 F | RESPIRATION RATE: 16 BRPM | WEIGHT: 193 LBS | HEIGHT: 75 IN | BODY MASS INDEX: 24 KG/M2 | HEART RATE: 66 BPM

## 2023-05-12 DIAGNOSIS — R22.0 SCALP MASS: Primary | ICD-10-CM

## 2023-05-12 PROCEDURE — 3600000002 HC SURGERY LEVEL 2 BASE: Performed by: STUDENT IN AN ORGANIZED HEALTH CARE EDUCATION/TRAINING PROGRAM

## 2023-05-12 PROCEDURE — 6360000002 HC RX W HCPCS: Performed by: NURSE ANESTHETIST, CERTIFIED REGISTERED

## 2023-05-12 PROCEDURE — 11423 EXC H-F-NK-SP B9+MARG 2.1-3: CPT | Performed by: STUDENT IN AN ORGANIZED HEALTH CARE EDUCATION/TRAINING PROGRAM

## 2023-05-12 PROCEDURE — 3700000000 HC ANESTHESIA ATTENDED CARE: Performed by: STUDENT IN AN ORGANIZED HEALTH CARE EDUCATION/TRAINING PROGRAM

## 2023-05-12 PROCEDURE — 7100000010 HC PHASE II RECOVERY - FIRST 15 MIN: Performed by: STUDENT IN AN ORGANIZED HEALTH CARE EDUCATION/TRAINING PROGRAM

## 2023-05-12 PROCEDURE — 7100000001 HC PACU RECOVERY - ADDTL 15 MIN: Performed by: STUDENT IN AN ORGANIZED HEALTH CARE EDUCATION/TRAINING PROGRAM

## 2023-05-12 PROCEDURE — 7100000000 HC PACU RECOVERY - FIRST 15 MIN: Performed by: STUDENT IN AN ORGANIZED HEALTH CARE EDUCATION/TRAINING PROGRAM

## 2023-05-12 PROCEDURE — 2709999900 HC NON-CHARGEABLE SUPPLY: Performed by: STUDENT IN AN ORGANIZED HEALTH CARE EDUCATION/TRAINING PROGRAM

## 2023-05-12 PROCEDURE — 6370000000 HC RX 637 (ALT 250 FOR IP): Performed by: ANESTHESIOLOGY

## 2023-05-12 PROCEDURE — 88304 TISSUE EXAM BY PATHOLOGIST: CPT

## 2023-05-12 PROCEDURE — 7100000011 HC PHASE II RECOVERY - ADDTL 15 MIN: Performed by: STUDENT IN AN ORGANIZED HEALTH CARE EDUCATION/TRAINING PROGRAM

## 2023-05-12 PROCEDURE — 3700000001 HC ADD 15 MINUTES (ANESTHESIA): Performed by: STUDENT IN AN ORGANIZED HEALTH CARE EDUCATION/TRAINING PROGRAM

## 2023-05-12 PROCEDURE — 2580000003 HC RX 258: Performed by: ANESTHESIOLOGY

## 2023-05-12 PROCEDURE — 6360000002 HC RX W HCPCS: Performed by: STUDENT IN AN ORGANIZED HEALTH CARE EDUCATION/TRAINING PROGRAM

## 2023-05-12 PROCEDURE — 3600000012 HC SURGERY LEVEL 2 ADDTL 15MIN: Performed by: STUDENT IN AN ORGANIZED HEALTH CARE EDUCATION/TRAINING PROGRAM

## 2023-05-12 PROCEDURE — 2500000003 HC RX 250 WO HCPCS: Performed by: STUDENT IN AN ORGANIZED HEALTH CARE EDUCATION/TRAINING PROGRAM

## 2023-05-12 PROCEDURE — 2500000003 HC RX 250 WO HCPCS: Performed by: NURSE ANESTHETIST, CERTIFIED REGISTERED

## 2023-05-12 PROCEDURE — 88305 TISSUE EXAM BY PATHOLOGIST: CPT

## 2023-05-12 RX ORDER — LIDOCAINE HYDROCHLORIDE 10 MG/ML
1 INJECTION, SOLUTION INFILTRATION; PERINEURAL
Status: DISCONTINUED | OUTPATIENT
Start: 2023-05-12 | End: 2023-05-12 | Stop reason: HOSPADM

## 2023-05-12 RX ORDER — OXYCODONE HYDROCHLORIDE 5 MG/1
5 TABLET ORAL
Status: COMPLETED | OUTPATIENT
Start: 2023-05-12 | End: 2023-05-12

## 2023-05-12 RX ORDER — SODIUM CHLORIDE 0.9 % (FLUSH) 0.9 %
5-40 SYRINGE (ML) INJECTION PRN
Status: DISCONTINUED | OUTPATIENT
Start: 2023-05-12 | End: 2023-05-12 | Stop reason: HOSPADM

## 2023-05-12 RX ORDER — DOCUSATE SODIUM 100 MG/1
100 CAPSULE, LIQUID FILLED ORAL 2 TIMES DAILY
Qty: 60 CAPSULE | Refills: 0 | Status: SHIPPED | OUTPATIENT
Start: 2023-05-12 | End: 2023-06-11

## 2023-05-12 RX ORDER — SODIUM CHLORIDE 0.9 % (FLUSH) 0.9 %
5-40 SYRINGE (ML) INJECTION EVERY 12 HOURS SCHEDULED
Status: DISCONTINUED | OUTPATIENT
Start: 2023-05-12 | End: 2023-05-12 | Stop reason: HOSPADM

## 2023-05-12 RX ORDER — TRAMADOL HYDROCHLORIDE 50 MG/1
50 TABLET ORAL EVERY 4 HOURS PRN
Qty: 15 TABLET | Refills: 0 | Status: SHIPPED | OUTPATIENT
Start: 2023-05-12 | End: 2023-05-15

## 2023-05-12 RX ORDER — SODIUM CHLORIDE 9 MG/ML
INJECTION, SOLUTION INTRAVENOUS PRN
Status: DISCONTINUED | OUTPATIENT
Start: 2023-05-12 | End: 2023-05-12 | Stop reason: HOSPADM

## 2023-05-12 RX ORDER — FENTANYL CITRATE 50 UG/ML
100 INJECTION, SOLUTION INTRAMUSCULAR; INTRAVENOUS
Status: DISCONTINUED | OUTPATIENT
Start: 2023-05-12 | End: 2023-05-12 | Stop reason: HOSPADM

## 2023-05-12 RX ORDER — MIDAZOLAM HYDROCHLORIDE 2 MG/2ML
2 INJECTION, SOLUTION INTRAMUSCULAR; INTRAVENOUS
Status: DISCONTINUED | OUTPATIENT
Start: 2023-05-12 | End: 2023-05-12 | Stop reason: HOSPADM

## 2023-05-12 RX ORDER — ASPIRIN 81 MG/1
81 TABLET, CHEWABLE ORAL ONCE
Status: COMPLETED | OUTPATIENT
Start: 2023-05-12 | End: 2023-05-12

## 2023-05-12 RX ORDER — PROCHLORPERAZINE EDISYLATE 5 MG/ML
5 INJECTION INTRAMUSCULAR; INTRAVENOUS
Status: DISCONTINUED | OUTPATIENT
Start: 2023-05-12 | End: 2023-05-12 | Stop reason: HOSPADM

## 2023-05-12 RX ORDER — IPRATROPIUM BROMIDE AND ALBUTEROL SULFATE 2.5; .5 MG/3ML; MG/3ML
1 SOLUTION RESPIRATORY (INHALATION)
Status: DISCONTINUED | OUTPATIENT
Start: 2023-05-12 | End: 2023-05-12 | Stop reason: HOSPADM

## 2023-05-12 RX ORDER — HYDROMORPHONE HYDROCHLORIDE 2 MG/ML
0.5 INJECTION, SOLUTION INTRAMUSCULAR; INTRAVENOUS; SUBCUTANEOUS EVERY 5 MIN PRN
Status: DISCONTINUED | OUTPATIENT
Start: 2023-05-12 | End: 2023-05-12 | Stop reason: HOSPADM

## 2023-05-12 RX ORDER — PROPOFOL 10 MG/ML
INJECTION, EMULSION INTRAVENOUS PRN
Status: DISCONTINUED | OUTPATIENT
Start: 2023-05-12 | End: 2023-05-12 | Stop reason: SDUPTHER

## 2023-05-12 RX ORDER — DEXMEDETOMIDINE HYDROCHLORIDE 100 UG/ML
INJECTION, SOLUTION INTRAVENOUS PRN
Status: DISCONTINUED | OUTPATIENT
Start: 2023-05-12 | End: 2023-05-12 | Stop reason: SDUPTHER

## 2023-05-12 RX ORDER — HALOPERIDOL 5 MG/ML
1 INJECTION INTRAMUSCULAR
Status: DISCONTINUED | OUTPATIENT
Start: 2023-05-12 | End: 2023-05-12 | Stop reason: HOSPADM

## 2023-05-12 RX ORDER — MIDAZOLAM HYDROCHLORIDE 1 MG/ML
INJECTION INTRAMUSCULAR; INTRAVENOUS PRN
Status: DISCONTINUED | OUTPATIENT
Start: 2023-05-12 | End: 2023-05-12 | Stop reason: SDUPTHER

## 2023-05-12 RX ORDER — SODIUM CHLORIDE, SODIUM LACTATE, POTASSIUM CHLORIDE, CALCIUM CHLORIDE 600; 310; 30; 20 MG/100ML; MG/100ML; MG/100ML; MG/100ML
INJECTION, SOLUTION INTRAVENOUS CONTINUOUS
Status: DISCONTINUED | OUTPATIENT
Start: 2023-05-12 | End: 2023-05-12 | Stop reason: HOSPADM

## 2023-05-12 RX ORDER — ACETAMINOPHEN 500 MG
1000 TABLET ORAL ONCE
Status: COMPLETED | OUTPATIENT
Start: 2023-05-12 | End: 2023-05-12

## 2023-05-12 RX ORDER — BUPIVACAINE HYDROCHLORIDE 5 MG/ML
INJECTION, SOLUTION EPIDURAL; INTRACAUDAL PRN
Status: DISCONTINUED | OUTPATIENT
Start: 2023-05-12 | End: 2023-05-12 | Stop reason: ALTCHOICE

## 2023-05-12 RX ADMIN — DEXMEDETOMIDINE 4 MCG: 100 INJECTION, SOLUTION, CONCENTRATE INTRAVENOUS at 13:19

## 2023-05-12 RX ADMIN — PROPOFOL 100 MCG/KG/MIN: 10 INJECTION, EMULSION INTRAVENOUS at 13:09

## 2023-05-12 RX ADMIN — OXYCODONE 5 MG: 5 TABLET ORAL at 14:36

## 2023-05-12 RX ADMIN — Medication 2000 MG: at 13:04

## 2023-05-12 RX ADMIN — MIDAZOLAM 2 MG: 1 INJECTION INTRAMUSCULAR; INTRAVENOUS at 13:01

## 2023-05-12 RX ADMIN — DEXMEDETOMIDINE 4 MCG: 100 INJECTION, SOLUTION, CONCENTRATE INTRAVENOUS at 13:15

## 2023-05-12 RX ADMIN — ASPIRIN 81 MG: 81 TABLET, CHEWABLE ORAL at 11:41

## 2023-05-12 RX ADMIN — DEXMEDETOMIDINE 4 MCG: 100 INJECTION, SOLUTION, CONCENTRATE INTRAVENOUS at 13:24

## 2023-05-12 RX ADMIN — DEXMEDETOMIDINE 4 MCG: 100 INJECTION, SOLUTION, CONCENTRATE INTRAVENOUS at 13:06

## 2023-05-12 RX ADMIN — SODIUM CHLORIDE, POTASSIUM CHLORIDE, SODIUM LACTATE AND CALCIUM CHLORIDE: 600; 310; 30; 20 INJECTION, SOLUTION INTRAVENOUS at 11:23

## 2023-05-12 RX ADMIN — ACETAMINOPHEN 1000 MG: 500 TABLET, FILM COATED ORAL at 11:25

## 2023-05-12 RX ADMIN — PROPOFOL 20 MG: 10 INJECTION, EMULSION INTRAVENOUS at 13:08

## 2023-05-12 ASSESSMENT — PAIN - FUNCTIONAL ASSESSMENT
PAIN_FUNCTIONAL_ASSESSMENT: NONE - DENIES PAIN
PAIN_FUNCTIONAL_ASSESSMENT: 0-10
PAIN_FUNCTIONAL_ASSESSMENT: NONE - DENIES PAIN
PAIN_FUNCTIONAL_ASSESSMENT: 0-10

## 2023-05-12 ASSESSMENT — PAIN DESCRIPTION - LOCATION: LOCATION: HEAD

## 2023-05-12 ASSESSMENT — PAIN SCALES - GENERAL: PAINLEVEL_OUTOF10: 4

## 2023-05-12 ASSESSMENT — PAIN DESCRIPTION - ORIENTATION: ORIENTATION: OUTER

## 2023-05-12 ASSESSMENT — PAIN DESCRIPTION - DESCRIPTORS: DESCRIPTORS: ACHING

## 2023-05-12 NOTE — DISCHARGE INSTRUCTIONS
DISCHARGE INSTRUCTIONS    Please call our office for a follow-up appointment in 1-2 week(s). Driving: No driving while taking pain medications    Activity: No strenous activity. Slowly advance as tolerated. No lifting greater than 20lbs. Diet:  Slowly advance as tolerated. Increase your fluids and fiber, as pain medications may cause constipation. No alcoholic beverages. Bathing: You may shower. No tub baths for 5 days. Dressing: If outer dressing, remove in 24 hours. Leave steri strips intact. Other:  Ice to incision as needed for pain. If drains present, empty and record    output daily. Call Dr. Faye Patrick if you have:  ? Temperature greater than 100.4  ? Persistent nausea and vomiting  ? Severe uncontrolled pain  ? Redness, tenderness, or signs of infection (pain, swelling, redness, odor or green/yellow discharge around the site)  ? Difficulty breathing, headache or visual disturbances  ? Hives  ? Persistent dizziness or light-headedness  ? Extreme fatigue  ? Any other questions or concerns you may have after discharge    In an emergency, call 911 or go to an Emergency Department at [facility] or [facility]).    It is important to bring a complete, current list of your medications to any medical appointments or hospitalizations. I understand and acknowledge receipt of the above instructions.         _____________________________                                                                                                                                        Patient or Guardian Signature                                                         Date/Time      ______________________________                                                                                                                                      YLBGWKCWN'U or R.N.'s Signature                                                        Date/Time      The discharge instructions have been reviewed with the

## 2023-05-12 NOTE — OP NOTE
Excision of scalp Mass Procedure Note      Name:  Meagan Rodriguez Date/Time of Admission: 2023 10:37 AM  CSN: [id number]  Attending Provider: Jesus Oleary, Lu  MRN:  [id number]  : 1965 61 y.o. Pre-operative Diagnosis: scalp Mass    Post-operative Diagnosis: Same    Procedure:  Excision of scalp mass 2.5x2.5cm    Surgeon: Jesus Oleary, *    Anesthesia: MAC anesthesia    Specimen: mass     INDICATION: The patient is a 68-year-old male who complains of mass to the scalp. They would like it surgically removed. DESCRIPTION OF PROCEDURE: The patient was correctly identified in preoperative holding area. Consent was confirmed and placed on the chart. Preoperative antibiotics were administered per SCIP protocol. The patient was then taken back to the operative suite and placed in the supine position. MAC anesthesia was performed per anesthesia. The patient's was then prepped and draped in the usual sterile fashion. A timeout was performed and all members of the team that were present were in agreement. The procedure began by making an incision over the apex of the mass. Scalpel was used to sharply dissect through the subcuticular tissue to the capsule of the mass. At this point hemostat was used to bluntly dissect circumferentially around the mass and its capsule. It was removed in its entirety. Further inspection of the cavity noted no other acute abnormalities. Hemostasis was achieved with electric cautery. The wound was irrigated with normal saline. Geovanna was used to close the incision. At this time, the procedure was complete. At the conclusion of the case all sponge, needles and instrument counts were correct. The patient tolerated well and was taken to the [address] [place] [address]. Unit.         [address]

## 2023-05-12 NOTE — H&P
Determinants of Health          Financial Resource Strain: Medium Risk    Difficulty of Paying Living Expenses: Somewhat hard   Food Insecurity: No Food Insecurity    Worried About Running Out of Food in the Last Year: Never true    Ran Out of Food in the Last Year: Never true   Transportation Needs: Unmet Transportation Needs    Lack of Transportation (Non-Medical): Yes   Housing Stability: Unknown    Unstable Housing in the Last Year: No               PHYSICAL EXAMINATION:     /64   Pulse 64   Ht 6' 3\" (1.905 m)   Wt 193 lb (87.5 kg)   SpO2 97%   BMI 24.12 kg/m²      General Appearance   AOx3, in no acute distress  Eyes    Conjunctivae/corneas clear. PERRL, EOM's intact. No scleral icterus  Ears, Nose, Throat      ENT exam normal, no neck nodes or sinus tenderness  Neck    supple, no significant adenopathy. No notable JVD  Respiratory      No chest wall deformities or tenderness, respiratory effort normal, no use of accessory muscles. Cardiovascular            RRR. No chest wall tenderness. Gastrointestinal           Abdomen soft, nontender, nondistended. BS x4. No rebound, guarding, or rigidity present. No palpable masses. No CVA tenderness  Lymphatics      No palpable lymphadenopathy, no hepatosplenomegaly  Musculoskeletal          No joint tenderness, deformity or swelling. Full ROM UE/LE. Distal pulses intact UE/LE. No edema, cyanosis, or venous stasis changes. Skin     Normal coloration and turgor, no rashes, 4 cm posterior scalp mass  Neurological    alert, oriented, normal speech, no focal findings or movement disorder noted, CN II-XII intact  Psychiatric       Alert and oriented, appropriate affect        STUDIES: None     IMPRESSION:  Scalp mass  Screening colonoscopy     PLAN:  We will plan for excision of posterior scalp mass.   Risks of surgery discussed with pt and/or family present include risks of anesthesia (cardiopulmonary complications), MI, CVA, DVT,pain, bleeding, infection,

## 2023-05-12 NOTE — ANESTHESIA POSTPROCEDURE EVALUATION
Department of Anesthesiology  Postprocedure Note    Patient: Shana Andrade  MRN: [id number]  YOB: 1965  Date of evaluation: 5/12/2023      Procedure Summary     Date: 05/12/23 Room / Location: [facility] MAIN OR  / [facility] MAIN OR    Anesthesia Start: 0754 Anesthesia Stop: 8816    Procedure: POSTERIOR SCALP MASS EXCISION (Head) Diagnosis:       Scalp mass      (Scalp mass [R22.0])    Providers: Sarai Lewis DO Responsible Provider: Marva Ford MD    Anesthesia Type: TIVA ASA Status: 3          Anesthesia Type: No value filed.     Eloy Phase I: Eloy Score: 8    Eloy Phase II:        Anesthesia Post Evaluation    Patient location during evaluation: PACU  Patient participation: complete - patient participated  Level of consciousness: awake and alert  Pain score: 2  Airway patency: patent  Nausea & Vomiting: no nausea  Complications: no  Cardiovascular status: blood pressure returned to baseline and hemodynamically stable  Respiratory status: acceptable  Hydration status: euvolemic  Multimodal analgesia pain management approach

## 2023-05-12 NOTE — ANESTHESIA PRE PROCEDURE
Department of Anesthesiology  Preprocedure Note       Name:  Sahara Sargent   Age:  61 y.o.  :  1965                                          MRN:  [id number]         Date:  2023      Surgeon: French Tsai):  Sabiha Napier DO    Procedure: Procedure(s):  POSTERIOR SCALP MASS EXCISION    Medications prior to admission:   Prior to Admission medications    Medication Sig Start Date End Date Taking?  Authorizing Provider   fluticasone (FLONASE) 50 MCG/ACT nasal spray 2 sprays by Each Nostril route daily 3/6/23   Stephon Marcos DO   diclofenac sodium (VOLTAREN) 1 % GEL Apply 2 g topically 4 times daily 3/6/23   Stephon Pattenis, DO   amLODIPine (NORVASC) 5 MG tablet Take 1 tablet by mouth daily  Patient not taking: Reported on 2023 3/6/23   Stephon Marcos DO       Current medications:    Current Facility-Administered Medications   Medication Dose Route Frequency Provider Last Rate Last Admin    lidocaine 1 % injection 1 mL  1 mL IntraDERmal Once PRN Roxanne Garcia MD        acetaminophen (TYLENOL) tablet 1,000 mg  1,000 mg Oral Once Roxanne Garcia MD        fentaNYL (SUBLIMAZE) injection 100 mcg  100 mcg IntraVENous Once PRN Roxanne Garcia MD        lactated ringers IV soln infusion   IntraVENous Continuous Roxanne Garcia MD        sodium chloride flush 0.9 % injection 5-40 mL  5-40 mL IntraVENous 2 times per day Roxanne Garcia MD        sodium chloride flush 0.9 % injection 5-40 mL  5-40 mL IntraVENous PRN Roxanne Garcia MD        0.9 % sodium chloride infusion   IntraVENous PRN Roxanne Garcia MD        midazolam PF (VERSED) injection 2 mg  2 mg IntraVENous Once PRN Roxanne Garcia MD        sodium chloride flush 0.9 % injection 5-40 mL  5-40 mL IntraVENous 2 times per day Sabiha Napier DO        sodium chloride flush 0.9 % injection 5-40 mL  5-40 mL IntraVENous PRN Sabiha Napier DO        0.9 % sodium chloride infusion   IntraVENous PRN Gill Linder

## 2023-06-06 ENCOUNTER — TELEPHONE (OUTPATIENT)
Dept: INTERNAL MEDICINE CLINIC | Facility: CLINIC | Age: 58
End: 2023-06-06

## 2023-06-06 NOTE — TELEPHONE ENCOUNTER
Called the number on the patients chart and the person that answered stated that the patient was not at that number. The patient was told about the appointment. The person that answered the phone stated that he would let the patient know that he missed his appointment and have the patient call back to reschedule.

## 2023-07-11 ENCOUNTER — PREP FOR PROCEDURE (OUTPATIENT)
Dept: SURGERY | Age: 58
End: 2023-07-11

## 2023-07-11 RX ORDER — SODIUM CHLORIDE 0.9 % (FLUSH) 0.9 %
5-40 SYRINGE (ML) INJECTION PRN
Status: CANCELLED | OUTPATIENT
Start: 2023-07-11

## 2023-07-11 RX ORDER — SODIUM CHLORIDE 9 MG/ML
INJECTION, SOLUTION INTRAVENOUS PRN
Status: CANCELLED | OUTPATIENT
Start: 2023-07-11

## 2023-07-11 RX ORDER — SODIUM CHLORIDE 0.9 % (FLUSH) 0.9 %
5-40 SYRINGE (ML) INJECTION EVERY 12 HOURS SCHEDULED
Status: CANCELLED | OUTPATIENT
Start: 2023-07-11

## 2023-07-20 PROBLEM — Z12.11 SPECIAL SCREENING FOR MALIGNANT NEOPLASMS, COLON: Status: ACTIVE | Noted: 2023-03-23

## 2023-08-19 PROBLEM — Z12.11 SPECIAL SCREENING FOR MALIGNANT NEOPLASMS, COLON: Status: RESOLVED | Noted: 2023-03-23 | Resolved: 2023-08-19

## (undated) DEVICE — GLOVE ORANGE PI 7   MSG9070

## (undated) DEVICE — SOLUTION IRRIG 1000ML STRL H2O USP PLAS POUR BTL

## (undated) DEVICE — SUTURE MCRYL SZ 3-0 L27IN ABSRB UD L19MM PS-2 3/8 CIR PRIM Y427H

## (undated) DEVICE — GLOVE SURG SZ 7 L12IN FNGR THK79MIL GRN LTX FREE

## (undated) DEVICE — ELECTRODE PT RET AD L9FT HI MOIST COND ADH HYDRGEL CORDED

## (undated) DEVICE — SOLUTION PREP PAINT POV IOD FOR SKIN MUCOUS MEM

## (undated) DEVICE — KIT PROCEDURE SURG HEAD AND NECK TOTE

## (undated) DEVICE — SOLUTION IRRIG 1000ML 0.9% SOD CHL USP POUR PLAS BTL

## (undated) DEVICE — ADHESIVE SKIN CLSR 0.7ML TOP DERMBND ADV